# Patient Record
Sex: FEMALE | Race: WHITE | NOT HISPANIC OR LATINO
[De-identification: names, ages, dates, MRNs, and addresses within clinical notes are randomized per-mention and may not be internally consistent; named-entity substitution may affect disease eponyms.]

---

## 2017-02-10 ENCOUNTER — CHARTING TRANS (OUTPATIENT)
Dept: OTHER | Age: 42
End: 2017-02-10

## 2017-07-18 ENCOUNTER — CHARTING TRANS (OUTPATIENT)
Dept: OTHER | Age: 42
End: 2017-07-18

## 2017-07-21 ENCOUNTER — CHARTING TRANS (OUTPATIENT)
Dept: OTHER | Age: 42
End: 2017-07-21

## 2017-08-07 ENCOUNTER — HOSPITAL ENCOUNTER (OUTPATIENT)
Dept: ULTRASOUND IMAGING | Age: 42
Discharge: HOME | End: 2017-08-07
Attending: NURSE PRACTITIONER
Payer: COMMERCIAL

## 2017-08-07 DIAGNOSIS — E06.3 CHRONIC LYMPHOCYTIC THYROIDITIS: ICD-10-CM

## 2017-08-07 PROCEDURE — 76536 US EXAM OF HEAD AND NECK: CPT | Performed by: NURSE PRACTITIONER

## 2018-04-19 ENCOUNTER — CHARTING TRANS (OUTPATIENT)
Dept: OTHER | Age: 43
End: 2018-04-19

## 2018-08-01 ENCOUNTER — CHARTING TRANS (OUTPATIENT)
Dept: OTHER | Age: 43
End: 2018-08-01

## 2018-08-03 ENCOUNTER — CHARTING TRANS (OUTPATIENT)
Dept: OTHER | Age: 43
End: 2018-08-03

## 2018-09-28 ENCOUNTER — CHARTING TRANS (OUTPATIENT)
Dept: OTHER | Age: 43
End: 2018-09-28

## 2018-11-01 VITALS
WEIGHT: 135 LBS | HEART RATE: 60 BPM | DIASTOLIC BLOOD PRESSURE: 64 MMHG | RESPIRATION RATE: 16 BRPM | SYSTOLIC BLOOD PRESSURE: 102 MMHG | TEMPERATURE: 97.8 F

## 2018-11-05 VITALS
OXYGEN SATURATION: 98 % | WEIGHT: 135 LBS | SYSTOLIC BLOOD PRESSURE: 120 MMHG | DIASTOLIC BLOOD PRESSURE: 62 MMHG | RESPIRATION RATE: 16 BRPM | HEART RATE: 58 BPM | TEMPERATURE: 98.1 F

## 2019-01-06 ENCOUNTER — HOSPITAL ENCOUNTER (EMERGENCY)
Age: 44
Discharge: HOME | End: 2019-01-06
Attending: EMERGENCY MEDICINE
Payer: COMMERCIAL

## 2019-01-06 ENCOUNTER — APPOINTMENT (OUTPATIENT)
Dept: GENERAL RADIOLOGY | Age: 44
End: 2019-01-06
Attending: PHYSICIAN ASSISTANT
Payer: COMMERCIAL

## 2019-01-06 VITALS
DIASTOLIC BLOOD PRESSURE: 71 MMHG | BODY MASS INDEX: 21.34 KG/M2 | SYSTOLIC BLOOD PRESSURE: 129 MMHG | WEIGHT: 125 LBS | RESPIRATION RATE: 16 BRPM | OXYGEN SATURATION: 100 % | HEART RATE: 57 BPM | TEMPERATURE: 98 F | HEIGHT: 64 IN

## 2019-01-06 DIAGNOSIS — S63.502A SPRAIN OF LEFT WRIST, INITIAL ENCOUNTER: Primary | ICD-10-CM

## 2019-01-06 DIAGNOSIS — T14.8XXA HEMATOMA: ICD-10-CM

## 2019-01-06 PROCEDURE — 99283 EMERGENCY DEPT VISIT LOW MDM: CPT

## 2019-01-06 PROCEDURE — 73110 X-RAY EXAM OF WRIST: CPT | Performed by: PHYSICIAN ASSISTANT

## 2019-01-06 RX ORDER — LIOTHYRONINE SODIUM 5 UG/1
5 TABLET ORAL DAILY
COMMUNITY

## 2019-01-07 NOTE — ED PROVIDER NOTES
Patient Seen in: [facility] In [place]    History   Patient presents with:  Upper Extremity Injury (musculoskeletal)    Stated Complaint: LEFT WRIST INJ (ICE SKATING)    46-year-old  female with a history of thyroid disease p Xr Wrist Complete (min 3 Views), Left (cpt=73110)    Result Date: 1/6/2019  PROCEDURE:  XR WRIST COMPLETE (MIN 3 VIEWS), LEFT (CPT=73110)  TECHNIQUE:  Three views were obtained. COMPARISON:  None.   INDICATIONS:  LEFT WRIST INJ (ICE SKATING)  PATIEN

## 2019-08-05 ENCOUNTER — WALK IN (OUTPATIENT)
Dept: URGENT CARE | Age: 44
End: 2019-08-05

## 2019-08-05 DIAGNOSIS — Z11.1 SCREENING-PULMONARY TB: Primary | ICD-10-CM

## 2019-08-05 PROCEDURE — 86580 TB INTRADERMAL TEST: CPT | Performed by: NURSE PRACTITIONER

## 2019-08-07 ENCOUNTER — WALK IN (OUTPATIENT)
Dept: URGENT CARE | Age: 44
End: 2019-08-07

## 2019-08-07 DIAGNOSIS — Z11.1 ENCOUNTER FOR PPD SKIN TEST READING: Primary | ICD-10-CM

## 2019-08-07 LAB
INDURATION: 0 MM (ref 0–?)
SKIN TEST RESULT: NEGATIVE

## 2019-08-07 PROCEDURE — X1094 NO CHARGE VISIT: HCPCS | Performed by: NURSE PRACTITIONER

## 2020-10-31 ENCOUNTER — WALK IN (OUTPATIENT)
Dept: URGENT CARE | Age: 45
End: 2020-10-31

## 2020-10-31 VITALS — TEMPERATURE: 98.3 F

## 2020-10-31 DIAGNOSIS — Z11.1 SCREENING-PULMONARY TB: Primary | ICD-10-CM

## 2020-10-31 PROCEDURE — 86580 TB INTRADERMAL TEST: CPT | Performed by: NURSE PRACTITIONER

## 2020-11-02 ENCOUNTER — WALK IN (OUTPATIENT)
Dept: URGENT CARE | Age: 45
End: 2020-11-02

## 2020-11-02 VITALS — TEMPERATURE: 98 F

## 2020-11-02 DIAGNOSIS — Z11.1 ENCOUNTER FOR PPD SKIN TEST READING: Primary | ICD-10-CM

## 2020-11-02 LAB
INDURATION: 0 MM (ref 0–?)
SKIN TEST RESULT: NEGATIVE

## 2020-11-02 PROCEDURE — X1094 NO CHARGE VISIT: HCPCS | Performed by: NURSE PRACTITIONER

## 2021-03-06 ENCOUNTER — HOSPITAL ENCOUNTER (OUTPATIENT)
Dept: ULTRASOUND IMAGING | Age: 46
Discharge: HOME | End: 2021-03-06
Attending: NURSE PRACTITIONER
Payer: COMMERCIAL

## 2021-03-06 DIAGNOSIS — E06.3 THYROIDITIS, AUTOIMMUNE: ICD-10-CM

## 2021-03-06 PROCEDURE — 76536 US EXAM OF HEAD AND NECK: CPT | Performed by: NURSE PRACTITIONER

## 2023-10-23 PROBLEM — R19.7 DIARRHEA: Status: ACTIVE | Noted: 2023-10-23

## 2024-01-17 ENCOUNTER — OFFICE VISIT (OUTPATIENT)
Dept: RHEUMATOLOGY | Facility: CLINIC | Age: 49
End: 2024-01-17

## 2024-01-17 VITALS
HEIGHT: 64 IN | RESPIRATION RATE: 16 BRPM | HEART RATE: 64 BPM | DIASTOLIC BLOOD PRESSURE: 66 MMHG | SYSTOLIC BLOOD PRESSURE: 108 MMHG | BODY MASS INDEX: 24.96 KG/M2 | WEIGHT: 146.19 LBS

## 2024-01-17 DIAGNOSIS — M25.50 POLYARTHRALGIA: Primary | ICD-10-CM

## 2024-01-17 DIAGNOSIS — M79.10 MYALGIA: ICD-10-CM

## 2024-01-17 PROCEDURE — 3008F BODY MASS INDEX DOCD: CPT | Performed by: INTERNAL MEDICINE

## 2024-01-17 PROCEDURE — 99204 OFFICE O/P NEW MOD 45 MIN: CPT | Performed by: INTERNAL MEDICINE

## 2024-01-17 PROCEDURE — 3078F DIAST BP <80 MM HG: CPT | Performed by: INTERNAL MEDICINE

## 2024-01-17 PROCEDURE — 3074F SYST BP LT 130 MM HG: CPT | Performed by: INTERNAL MEDICINE

## 2024-01-17 RX ORDER — AZELASTINE 1 MG/ML
2 SPRAY, METERED NASAL 2 TIMES DAILY
COMMUNITY

## 2024-01-17 RX ORDER — LIOTHYRONINE SODIUM 25 UG/1
25 TABLET ORAL DAILY
COMMUNITY
Start: 2023-10-16

## 2024-01-17 RX ORDER — CLONAZEPAM 0.5 MG/1
0.5 TABLET ORAL NIGHTLY PRN
COMMUNITY
Start: 2023-11-17

## 2024-01-17 RX ORDER — LEVOTHYROXINE SODIUM 88 UG/1
1 CAPSULE ORAL DAILY
COMMUNITY
Start: 2023-10-30

## 2024-01-17 NOTE — PROGRESS NOTES
Monika Andrew is a 48 year old female who presents for   Chief Complaint   Patient presents with    Consult     Positive SONYA   .   HPI:     I had the pleasure of seeing Monika Andrew on 1/17/2024 for evaluation.     She is a pleasant 48 year old who has a positive SONYA 1:640. She has hashimoto's. She was referred by Blanca Plummer NP from [facility].   She felt a significant decline in energy and tired all the time for the last spring 2023.   She has been treated since 2018. She has some ups and downs through all these years.   She has changed diet and that has helped.     She has been sticking with dairy free and gluten free for the last 6 months.  She does  feels better with this.     She feels more joint pain - it's random - it's her ankle for a couple of days then this goes away. Then her knee and then this goes away.   She had an elbow injury and now walking on an incline on a treadmill. It's also not high impact. She has noticed for the last few months - she has pain here and there.   She has right sided neck and shoulder pain - this is where she carries her stress.   No swelling in the joints.   She feels she has gained 15 lbs in the last year. She is rarely drinks alcohol. She gets 8 hours of sleep at night. And no caffiene.   She is tired and has to feel like she has to nap.     She has diffuse hair loss. Recently it's consistent and has in the past been connected to her hashimoto's.   No sicca   No raynaud's.   No psoraisis.   She has photosensitivity - - her chest and face area can get red - has gotten hives in the sun as well -     No numnbness or tingling, no migraines.   Has stomach issues - better on dairy free and gluten free.   Was checked for celiac int he past and this was negative.     She feels more energy when she exercises - tries to do this in the morning.     No hx of illness - except in 12/2022 - she got sick - but not in the spring 2023 -   Covid was negative - she was  sick for a few weeks.       Wt Readings from Last 2 Encounters:   24 146 lb 3.2 oz (66.3 kg)   23 138 lb (62.6 kg)     Body mass index is 25.1 kg/m².      Current Outpatient Medications   Medication Sig Dispense Refill    liothyronine 25 MCG Oral Tab Take 1 tablet (25 mcg total) by mouth daily.      TIROSINT 88 MCG Oral Cap Take 1 capsule by mouth daily. ON AN EMPTY STOMACH      clonazePAM 0.5 MG Oral Tab Take 1 tablet (0.5 mg total) by mouth nightly as needed for Anxiety.      azelastine 0.1 % Nasal Solution 2 sprays by Nasal route 2 (two) times daily.      Liothyronine Sodium 5 MCG Oral Tab Take 1 tablet (5 mcg total) by mouth daily.        Past Medical History:   Diagnosis Date    Bloating     Decorative tattoo     Diarrhea, unspecified     Easy bruising     Fatigue     Frequent use of laxatives     Irregular bowel habits     Stool incontinence     Thyroid disease     Weight gain       Past Surgical History:   Procedure Laterality Date     DELIVERY ONLY        Family History   Problem Relation Age of Onset    Heart Attack Father     Stroke Father       Social History: hashimoto's - maternal grandmother and brother -   Social History     Socioeconomic History    Marital status:    Tobacco Use    Smoking status: Never    Smokeless tobacco: Never   Substance and Sexual Activity    Alcohol use: Not Currently     Alcohol/week: 1.0 standard drink of alcohol     Types: 1 Standard drinks or equivalent per week    Drug use: Never      1 child    -      REVIEW OF SYSTEMS:   Review Of Systems:  Fatigue  Constitutional:No fever, no change in weight or appetitie  Derm: No rashes, no oral ulcers, no alopecia, no photosensitivity, no psoriasis  HEENT: No dry eyes, no dry mouth, no Raynaud's, no nasal ulcers, no parotid swelling, gets right sided  neck pain, no jaw pain, no temple pain  Eyes: No visual changes,   CVS: No chest pain, no heart  disease  RS: No SOB, no Cough, No Pleurtic pain,   GI: No nausea, no vomiiting, no abominal pain, no hx of ulcer, no gastritis, no heartburn, no dyshpagia, no BRBPR or melena  : no dysuria, no hx of miscarriages, no DVT Hx, no hx of OCP,   Neuro: No numbness or tingling, no headache, no hx of seizures,   Psych: no hx of anxiety or depression  ENDO: no hx of thyroid disease, no hx of DM  Joint/Muscluskeltal: see HPI,   All other ROS are negative.     EXAM:   /66   Pulse 64   Resp 16   Ht 5' 4\" (1.626 m)   Wt 146 lb 3.2 oz (66.3 kg)   BMI 25.10 kg/m²   HEENT: Clear oropharynx, no oral ulcers, EOM intact, clear sclear, PERRLA, pleasant, no acute distress, no CAD,   No rashes  CVS: RRR, no murmurs  RS: CTAB, no crackles, no rhonchi  ABD: Soft Non tender, no HSM felt, BS positive  Joint exam:   Right sided paracervical  neck tendnerness, good ROM,   No other joint  tenderness   No sclerodactly   Good cap refill   EXTREMITIES: no cyanosis, clubbing or edema  NEURO: intact touch, 5/5 ue and le strength      10/4/2023 -   Vit d  110   LOPEZ 1:640 - nuclear , homogenous   Ds dna 2, anti smith <1.0, anti rnp <1.0, anti sm/rnp <1.0, anti ssa <1.0, anti ssb <1.0, anti scl 70 <1.0, anti jo1<1.0, anti centromere b <1.0, anti ribosomal P<1.0,   Crp 1.5,   Thyroglobuline 21,   TPO abs I 434   Iron, total 218, ferritine is 58, iron stat is 66 %  ASSESSMENT AND PLAN:   Monika Andrew is a 48 year old female who presents for   Chief Complaint   Patient presents with    Consult     Positive LOPEZ     Positive LOPEZ 1:640 - fatigue  since 3/2023   -likely lopez positive due to positive thyroglobulin/positive TPO ab  - CRP has been elevated -   - check cbc - eval for anemia   - check cmp - check kidney and liver tests   - check esr and crp - eval for inflammatio   - intermittent joint pains - check lymes, rf, ccp   - tests specific for lupus are negative. Doubt she has lupus, sjogrens or scleroderma   - some muscle fatigue -  check ck, aldolase  Able to go up the stairs - so I dont' think she needs echo     Summary  Check labs   If anything is postiive on that - I will have you come in for another visit -    Sarath Putnam MD  1/17/2024  12:36 PM

## 2024-01-29 LAB
ABSOLUTE BASOPHILS: 21 CELLS/UL (ref 0–200)
ABSOLUTE EOSINOPHILS: 111 CELLS/UL (ref 15–500)
ABSOLUTE LYMPHOCYTES: 1509 CELLS/UL (ref 850–3900)
ABSOLUTE MONOCYTES: 361 CELLS/UL (ref 200–950)
ABSOLUTE NEUTROPHILS: 2099 CELLS/UL (ref 1500–7800)
ALBUMIN/GLOBULIN RATIO: 1.6 (CALC) (ref 1–2.5)
ALBUMIN: 4.4 G/DL (ref 3.6–5.1)
ALDOLASE: 4.2 U/L
ALKALINE PHOSPHATASE: 64 U/L (ref 31–125)
ALT: 13 U/L (ref 6–29)
AST: 18 U/L (ref 10–35)
BASOPHILS: 0.5 %
BILIRUBIN, TOTAL: 0.7 MG/DL (ref 0.2–1.2)
BUN: 14 MG/DL (ref 7–25)
C-REACTIVE PROTEIN: 0.6 MG/L
CALCIUM: 8.8 MG/DL (ref 8.6–10.2)
CARBON DIOXIDE: 21 MMOL/L (ref 20–32)
CHLORIDE: 109 MMOL/L (ref 98–110)
COMPLEMENT COMPONENT C3C: 102 MG/DL (ref 83–193)
COMPLEMENT COMPONENT C4C: 22 MG/DL (ref 15–57)
CREATINE KINASE, TOTAL: 42 U/L (ref 29–143)
CREATININE: 0.56 MG/DL (ref 0.5–0.99)
CYCLIC CITRULLINATED$PEPTIDE (CCP) AB (IGG): <16 UNITS
EGFR: 114 ML/MIN/1.73M2
EOSINOPHILS: 2.7 %
GLOBULIN: 2.7 G/DL (CALC) (ref 1.9–3.7)
GLUCOSE: 81 MG/DL (ref 65–99)
HEMATOCRIT: 43.1 % (ref 35–45)
HEMOGLOBIN: 14.8 G/DL (ref 11.7–15.5)
LYME AB SCREEN: <0.9 INDEX
LYMPHOCYTES: 36.8 %
MCH: 31.7 PG (ref 27–33)
MCHC: 34.3 G/DL (ref 32–36)
MCV: 92.3 FL (ref 80–100)
MONOCYTES: 8.8 %
MPV: 10.1 FL (ref 7.5–12.5)
NEUTROPHILS: 51.2 %
PLATELET COUNT: 261 THOUSAND/UL (ref 140–400)
POTASSIUM: 4 MMOL/L (ref 3.5–5.3)
PROTEIN, TOTAL: 7.1 G/DL (ref 6.1–8.1)
RDW: 12.1 % (ref 11–15)
RED BLOOD CELL COUNT: 4.67 MILLION/UL (ref 3.8–5.1)
RHEUMATOID FACTOR: <14 IU/ML
SED RATE BY MODIFIED$WESTERGREN: 2 MM/H
SODIUM: 141 MMOL/L (ref 135–146)
WHITE BLOOD CELL COUNT: 4.1 THOUSAND/UL (ref 3.8–10.8)

## (undated) NOTE — ED AVS SNAPSHOT
Charles Yatesflorin   MRN: [id number]    Department:  [facility] in [place]   Date of Visit:  1/6/2019           Disclosure     Insurance plans vary and the physician(s) referred by the ER may not be covered by your plan.  Please cont tell this physician (or your personal doctor if your instructions are to return to your personal doctor) about any new or lasting problems. The primary care or specialist physician will see patients referred from the [facility] Emergency Department.  Christie Hutchinson